# Patient Record
Sex: MALE | Race: WHITE | NOT HISPANIC OR LATINO | Employment: FULL TIME | URBAN - NONMETROPOLITAN AREA
[De-identification: names, ages, dates, MRNs, and addresses within clinical notes are randomized per-mention and may not be internally consistent; named-entity substitution may affect disease eponyms.]

---

## 2024-06-25 ENCOUNTER — HOSPITAL ENCOUNTER (EMERGENCY)
Facility: HOSPITAL | Age: 44
Discharge: HOME | End: 2024-06-25
Attending: EMERGENCY MEDICINE
Payer: COMMERCIAL

## 2024-06-25 VITALS
TEMPERATURE: 99.2 F | HEART RATE: 72 BPM | DIASTOLIC BLOOD PRESSURE: 73 MMHG | HEIGHT: 71 IN | BODY MASS INDEX: 29.4 KG/M2 | OXYGEN SATURATION: 95 % | RESPIRATION RATE: 16 BRPM | WEIGHT: 210 LBS | SYSTOLIC BLOOD PRESSURE: 121 MMHG

## 2024-06-25 DIAGNOSIS — K52.9 ACUTE GASTROENTERITIS: Primary | ICD-10-CM

## 2024-06-25 LAB
ALBUMIN SERPL BCP-MCNC: 4.1 G/DL (ref 3.4–5)
ALP SERPL-CCNC: 84 U/L (ref 33–120)
ALT SERPL W P-5'-P-CCNC: 23 U/L (ref 10–52)
ANION GAP SERPL CALC-SCNC: 12 MMOL/L (ref 10–20)
APPEARANCE UR: CLEAR
AST SERPL W P-5'-P-CCNC: 19 U/L (ref 9–39)
BASOPHILS # BLD AUTO: 0.02 X10*3/UL (ref 0–0.1)
BASOPHILS NFR BLD AUTO: 0.2 %
BILIRUB SERPL-MCNC: 0.5 MG/DL (ref 0–1.2)
BILIRUB UR STRIP.AUTO-MCNC: NEGATIVE MG/DL
BUN SERPL-MCNC: 18 MG/DL (ref 6–23)
CALCIUM SERPL-MCNC: 8.7 MG/DL (ref 8.6–10.3)
CHLORIDE SERPL-SCNC: 104 MMOL/L (ref 98–107)
CO2 SERPL-SCNC: 23 MMOL/L (ref 21–32)
COLOR UR: YELLOW
CREAT SERPL-MCNC: 0.89 MG/DL (ref 0.5–1.3)
EGFRCR SERPLBLD CKD-EPI 2021: >90 ML/MIN/1.73M*2
EOSINOPHIL # BLD AUTO: 0.02 X10*3/UL (ref 0–0.7)
EOSINOPHIL NFR BLD AUTO: 0.2 %
ERYTHROCYTE [DISTWIDTH] IN BLOOD BY AUTOMATED COUNT: 13.3 % (ref 11.5–14.5)
FLUAV RNA RESP QL NAA+PROBE: NOT DETECTED
FLUBV RNA RESP QL NAA+PROBE: NOT DETECTED
GLUCOSE SERPL-MCNC: 117 MG/DL (ref 74–99)
GLUCOSE UR STRIP.AUTO-MCNC: NORMAL MG/DL
HCT VFR BLD AUTO: 46.9 % (ref 41–52)
HGB BLD-MCNC: 15.7 G/DL (ref 13.5–17.5)
IMM GRANULOCYTES # BLD AUTO: 0.03 X10*3/UL (ref 0–0.7)
IMM GRANULOCYTES NFR BLD AUTO: 0.3 % (ref 0–0.9)
KETONES UR STRIP.AUTO-MCNC: NEGATIVE MG/DL
LEUKOCYTE ESTERASE UR QL STRIP.AUTO: NEGATIVE
LIPASE SERPL-CCNC: 10 U/L (ref 9–82)
LYMPHOCYTES # BLD AUTO: 0.78 X10*3/UL (ref 1.2–4.8)
LYMPHOCYTES NFR BLD AUTO: 6.9 %
MCH RBC QN AUTO: 30.7 PG (ref 26–34)
MCHC RBC AUTO-ENTMCNC: 33.5 G/DL (ref 32–36)
MCV RBC AUTO: 92 FL (ref 80–100)
MONOCYTES # BLD AUTO: 0.46 X10*3/UL (ref 0.1–1)
MONOCYTES NFR BLD AUTO: 4.1 %
MUCOUS THREADS #/AREA URNS AUTO: NORMAL /LPF
NEUTROPHILS # BLD AUTO: 9.99 X10*3/UL (ref 1.2–7.7)
NEUTROPHILS NFR BLD AUTO: 88.3 %
NITRITE UR QL STRIP.AUTO: NEGATIVE
NRBC BLD-RTO: 0 /100 WBCS (ref 0–0)
PH UR STRIP.AUTO: 6.5 [PH]
PLATELET # BLD AUTO: 234 X10*3/UL (ref 150–450)
POTASSIUM SERPL-SCNC: 3.6 MMOL/L (ref 3.5–5.3)
PROT SERPL-MCNC: 6.5 G/DL (ref 6.4–8.2)
PROT UR STRIP.AUTO-MCNC: ABNORMAL MG/DL
RBC # BLD AUTO: 5.12 X10*6/UL (ref 4.5–5.9)
RBC # UR STRIP.AUTO: NEGATIVE /UL
RBC #/AREA URNS AUTO: NORMAL /HPF
SARS-COV-2 RNA RESP QL NAA+PROBE: NOT DETECTED
SODIUM SERPL-SCNC: 135 MMOL/L (ref 136–145)
SP GR UR STRIP.AUTO: 1.04
UROBILINOGEN UR STRIP.AUTO-MCNC: ABNORMAL MG/DL
WBC # BLD AUTO: 11.3 X10*3/UL (ref 4.4–11.3)
WBC #/AREA URNS AUTO: NORMAL /HPF

## 2024-06-25 PROCEDURE — 80053 COMPREHEN METABOLIC PANEL: CPT | Performed by: EMERGENCY MEDICINE

## 2024-06-25 PROCEDURE — 96375 TX/PRO/DX INJ NEW DRUG ADDON: CPT

## 2024-06-25 PROCEDURE — 83690 ASSAY OF LIPASE: CPT | Performed by: EMERGENCY MEDICINE

## 2024-06-25 PROCEDURE — 81001 URINALYSIS AUTO W/SCOPE: CPT | Performed by: EMERGENCY MEDICINE

## 2024-06-25 PROCEDURE — 36415 COLL VENOUS BLD VENIPUNCTURE: CPT | Performed by: EMERGENCY MEDICINE

## 2024-06-25 PROCEDURE — 85025 COMPLETE CBC W/AUTO DIFF WBC: CPT | Performed by: EMERGENCY MEDICINE

## 2024-06-25 PROCEDURE — 99284 EMERGENCY DEPT VISIT MOD MDM: CPT

## 2024-06-25 PROCEDURE — 96374 THER/PROPH/DIAG INJ IV PUSH: CPT

## 2024-06-25 PROCEDURE — 2500000004 HC RX 250 GENERAL PHARMACY W/ HCPCS (ALT 636 FOR OP/ED): Performed by: EMERGENCY MEDICINE

## 2024-06-25 PROCEDURE — 87636 SARSCOV2 & INF A&B AMP PRB: CPT | Performed by: EMERGENCY MEDICINE

## 2024-06-25 PROCEDURE — 96361 HYDRATE IV INFUSION ADD-ON: CPT

## 2024-06-25 RX ORDER — ONDANSETRON HYDROCHLORIDE 2 MG/ML
4 INJECTION, SOLUTION INTRAVENOUS ONCE
Status: COMPLETED | OUTPATIENT
Start: 2024-06-25 | End: 2024-06-25

## 2024-06-25 RX ORDER — ONDANSETRON 4 MG/1
4 TABLET, ORALLY DISINTEGRATING ORAL EVERY 8 HOURS PRN
Qty: 30 TABLET | Refills: 0 | Status: SHIPPED | OUTPATIENT
Start: 2024-06-25

## 2024-06-25 RX ORDER — DICYCLOMINE HYDROCHLORIDE 20 MG/1
20 TABLET ORAL
Qty: 30 TABLET | Refills: 0 | Status: SHIPPED | OUTPATIENT
Start: 2024-06-25

## 2024-06-25 RX ORDER — KETOROLAC TROMETHAMINE 30 MG/ML
15 INJECTION, SOLUTION INTRAMUSCULAR; INTRAVENOUS ONCE
Status: COMPLETED | OUTPATIENT
Start: 2024-06-25 | End: 2024-06-25

## 2024-06-25 ASSESSMENT — PAIN DESCRIPTION - LOCATION
LOCATION: GENERALIZED
LOCATION: GENERALIZED

## 2024-06-25 ASSESSMENT — PAIN SCALES - GENERAL
PAINLEVEL_OUTOF10: 0 - NO PAIN
PAINLEVEL_OUTOF10: 6
PAINLEVEL_OUTOF10: 6

## 2024-06-25 ASSESSMENT — COLUMBIA-SUICIDE SEVERITY RATING SCALE - C-SSRS
6. HAVE YOU EVER DONE ANYTHING, STARTED TO DO ANYTHING, OR PREPARED TO DO ANYTHING TO END YOUR LIFE?: NO
1. IN THE PAST MONTH, HAVE YOU WISHED YOU WERE DEAD OR WISHED YOU COULD GO TO SLEEP AND NOT WAKE UP?: NO
2. HAVE YOU ACTUALLY HAD ANY THOUGHTS OF KILLING YOURSELF?: NO

## 2024-06-25 ASSESSMENT — PAIN DESCRIPTION - PROGRESSION: CLINICAL_PROGRESSION: NOT CHANGED

## 2024-06-25 ASSESSMENT — PAIN - FUNCTIONAL ASSESSMENT
PAIN_FUNCTIONAL_ASSESSMENT: 0-10
PAIN_FUNCTIONAL_ASSESSMENT: 0-10

## 2024-06-25 ASSESSMENT — PAIN DESCRIPTION - PAIN TYPE: TYPE: ACUTE PAIN

## 2024-06-25 NOTE — Clinical Note
Keith Pederson was seen and treated in our emergency department on 6/25/2024.  He may return to work on 06/27/2024.       If you have any questions or concerns, please don't hesitate to call.      Tramaine Anderson, DO

## 2024-06-26 LAB — HOLD SPECIMEN: NORMAL

## 2024-06-26 NOTE — ED PROVIDER NOTES
HPI   Chief Complaint   Patient presents with    Flu Symptoms     Pt comes in for flu symptoms. Pt states that late last night/early this am he started with nausea, vomiting, diarrhea, fevers, chills, fatigue, body aches. Pt states he took Nyquil around 1130 for a temp of 101.       Limitations to History: None    HPI: 44-year-old male presents with concern for nausea, vomiting, diarrhea.  Began approximately 12 hours ago.  Nonbloody nonbilious.  Abdominal cramping.  Denies any fever, chills, cough, chest pain, shortness of breath, urinary symptoms.  No sick contacts.    Additional History Obtained from: Significant other at the bedside.    ------------------------------------------------------------------------------------------------------------------------------------------  Physical Exam:    VS: As documented in the triage note and EMR flowsheet from this visit were reviewed.    Appearance: Alert. cooperative,  in no acute distress.   Skin: Intact,  dry skin, no lesions, rash, petechiae or purpura.   Eyes: PERRLA, EOMs intact,  Conjunctiva pink with no redness or exudates.   HENT: Normocephalic, atraumatic. Nares patent. No intraoral lesions.   Neck: Supple, without meningismus. Trachea at midline. No lymphadenopathy.  Pulmonary: Clear bilaterally with good chest wall excursion. No rales, rhonchi or wheezing. No accessory muscle use or stridor.  Cardiac: Regular rate and rhythm, no rubs, murmurs, or gallops.   Abdomen: Abdomen is soft, nontender, and nondistended.  No palpable organomegaly.  No rebound or guarding.  No CVA tenderness. Nonsurgical abdomen.  Genitourinary: Exam deferred.  Musculoskeletal: Full range of motion.  Pulses full and equal. No cyanosis, clubbing, or edema.  Psychiatric: Appropriate mood and affect.                            No data recorded                   Patient History   No past medical history on file.  No past surgical history on file.  No family history on file.  Social History      Tobacco Use    Smoking status: Not on file    Smokeless tobacco: Not on file   Substance Use Topics    Alcohol use: Not on file    Drug use: Not on file       Physical Exam   ED Triage Vitals [06/25/24 2015]   Temperature Heart Rate Respirations BP   37.3 °C (99.2 °F) 87 18 125/86      Pulse Ox Temp Source Heart Rate Source Patient Position   97 % Oral Monitor Sitting      BP Location FiO2 (%)     Left arm --       Physical Exam    ED Course & MDM   Diagnoses as of 06/25/24 2118   Acute gastroenteritis       Medical Decision Making  Labs Reviewed  CBC WITH AUTO DIFFERENTIAL - Abnormal     WBC                           11.3                   nRBC                          0.0                    RBC                           5.12                   Hemoglobin                    15.7                   Hematocrit                    46.9                   MCV                           92                     MCH                           30.7                   MCHC                          33.5                   RDW                           13.3                   Platelets                     234                    Neutrophils %                 88.3                   Immature Granulocytes %, Automated   0.3                    Lymphocytes %                 6.9                    Monocytes %                   4.1                    Eosinophils %                 0.2                    Basophils %                   0.2                    Neutrophils Absolute          9.99 (*)               Immature Granulocytes Absolute, Au*   0.03                   Lymphocytes Absolute          0.78 (*)               Monocytes Absolute            0.46                   Eosinophils Absolute          0.02                   Basophils Absolute            0.02                COMPREHENSIVE METABOLIC PANEL - Abnormal     Glucose                       117 (*)                Sodium                        135 (*)                Potassium                      3.6                    Chloride                      104                    Bicarbonate                   23                     Anion Gap                     12                     Urea Nitrogen                 18                     Creatinine                    0.89                   eGFR                          >90                    Calcium                       8.7                    Albumin                       4.1                    Alkaline Phosphatase          84                     Total Protein                 6.5                    AST                           19                     Bilirubin, Total              0.5                    ALT                           23                  LIPASE - Normal     Lipase                        10                         Narrative: Venipuncture immediately after or during the administration of Metamizole may lead to falsely low results. Testing should be performed immediately prior to Metamizole dosing.  INFLUENZA A AND B PCR - Normal     Flu A Result                                         Flu B Result                                             Narrative: This assay is an in vitro diagnostic multiplex nucleic acid amplification test for the detection and discrimination of Influenza A & B from nasopharyngeal specimens, and has been validated for use at [facility]. Negative results do not preclude Influenza A/B infections, and should not be used as the sole basis for diagnosis, treatment, or other management decisions. If Influenza A/B and RSV PCR results are negative, testing for Parainfluenza virus, Adenovirus and Metapneumovirus is routinely performed for [facility] pediatric oncology and intensive care inpatients, and is available on other patients by placing an add-on request.  SARS-COV-2 PCR - Normal     Coronavirus 2019, PCR                                    Narrative: This assay has received FDA Emergency Use Authorization  (EUA) and is only authorized for the duration of time that circumstances exist to justify the authorization of the emergency use of in vitro diagnostic tests for the detection of SARS-CoV-2 virus and/or diagnosis of COVID-19 infection under section 564(b)(1) of the Act, 21 U.S.C. 360bbb-3(b)(1). This assay is an in vitro diagnostic nucleic acid amplification test for the qualitative detection of SARS-CoV-2 from nasopharyngeal specimens and has been validated for use at [facility]. Negative results do not preclude COVID-19 infections and should not be used as the sole basis for diagnosis, treatment, or other management decisions.                    URINALYSIS WITH REFLEX CULTURE AND MICROSCOPIC         Narrative: The following orders were created for panel order Urinalysis with Reflex Culture and Microscopic.                  Procedure                               Abnormality         Status                                     ---------                               -----------         ------                                     Urinalysis with Reflex C...[[id number]]                                                                 Extra Urine Gray Tube[[id number]]                                                                                         Please view results for these tests on the individual orders.  URINALYSIS WITH REFLEX CULTURE AND MICROSCOPIC  EXTRA URINE GRAY TUBE    Medical Decision Making:    Patient appears well nontoxic.  Vital signs within normal limits.  Lab work unremarkable.  Patient treated with 1 L normal saline, Toradol, Zofran.  Feeling improved.  Will be written for Bentyl and Zofran at home.  Advised continued oral hydration.  Stable at time of discharge.    Differential Diagnoses Considered: Viral illness, gastroenteritis, electrolyte abnormality, volume depletion    Escalation of Care:  Appropriate for discharge and follow-up with primary care.    Prescription  Drug Consideration: Oral Bentyl and Zofran.            Procedure  Procedures     Tramaine Anderson, DO  06/25/24 5390

## 2024-11-01 ENCOUNTER — HOSPITAL ENCOUNTER (EMERGENCY)
Facility: HOSPITAL | Age: 44
Discharge: HOME | End: 2024-11-01
Attending: EMERGENCY MEDICINE
Payer: COMMERCIAL

## 2024-11-01 ENCOUNTER — APPOINTMENT (OUTPATIENT)
Dept: RADIOLOGY | Facility: HOSPITAL | Age: 44
End: 2024-11-01
Payer: COMMERCIAL

## 2024-11-01 VITALS
RESPIRATION RATE: 16 BRPM | TEMPERATURE: 98.5 F | OXYGEN SATURATION: 98 % | BODY MASS INDEX: 28.7 KG/M2 | SYSTOLIC BLOOD PRESSURE: 141 MMHG | DIASTOLIC BLOOD PRESSURE: 69 MMHG | HEART RATE: 69 BPM | HEIGHT: 71 IN | WEIGHT: 205 LBS

## 2024-11-01 DIAGNOSIS — S61.411A LACERATION OF RIGHT HAND, FOREIGN BODY PRESENCE UNSPECIFIED, INITIAL ENCOUNTER: Primary | ICD-10-CM

## 2024-11-01 PROCEDURE — 90715 TDAP VACCINE 7 YRS/> IM: CPT | Performed by: EMERGENCY MEDICINE

## 2024-11-01 PROCEDURE — 73130 X-RAY EXAM OF HAND: CPT | Mod: RIGHT SIDE | Performed by: RADIOLOGY

## 2024-11-01 PROCEDURE — 96372 THER/PROPH/DIAG INJ SC/IM: CPT | Mod: 59 | Performed by: EMERGENCY MEDICINE

## 2024-11-01 PROCEDURE — 90471 IMMUNIZATION ADMIN: CPT | Performed by: EMERGENCY MEDICINE

## 2024-11-01 PROCEDURE — 12001 RPR S/N/AX/GEN/TRNK 2.5CM/<: CPT | Performed by: PHYSICIAN ASSISTANT

## 2024-11-01 PROCEDURE — 2500000004 HC RX 250 GENERAL PHARMACY W/ HCPCS (ALT 636 FOR OP/ED): Performed by: EMERGENCY MEDICINE

## 2024-11-01 PROCEDURE — 2500000005 HC RX 250 GENERAL PHARMACY W/O HCPCS: Performed by: PHYSICIAN ASSISTANT

## 2024-11-01 PROCEDURE — 73130 X-RAY EXAM OF HAND: CPT | Mod: RT

## 2024-11-01 PROCEDURE — 99283 EMERGENCY DEPT VISIT LOW MDM: CPT | Mod: 25

## 2024-11-01 RX ORDER — CEFAZOLIN 1 G/1
1 INJECTION, POWDER, FOR SOLUTION INTRAVENOUS ONCE
Status: COMPLETED | OUTPATIENT
Start: 2024-11-01 | End: 2024-11-01

## 2024-11-01 RX ORDER — KETOROLAC TROMETHAMINE 30 MG/ML
15 INJECTION, SOLUTION INTRAMUSCULAR; INTRAVENOUS ONCE
Status: COMPLETED | OUTPATIENT
Start: 2024-11-01 | End: 2024-11-01

## 2024-11-01 RX ORDER — BACITRACIN ZINC 500 UNIT/G
OINTMENT IN PACKET (EA) TOPICAL 3 TIMES DAILY
Status: DISCONTINUED | OUTPATIENT
Start: 2024-11-01 | End: 2024-11-01 | Stop reason: HOSPADM

## 2024-11-01 RX ORDER — CEPHALEXIN 500 MG/1
500 CAPSULE ORAL 4 TIMES DAILY
Qty: 28 CAPSULE | Refills: 0 | Status: SHIPPED | OUTPATIENT
Start: 2024-11-01 | End: 2024-11-08

## 2024-11-01 RX ORDER — KETOROLAC TROMETHAMINE 10 MG/1
10 TABLET, FILM COATED ORAL EVERY 6 HOURS PRN
Qty: 20 TABLET | Refills: 0 | Status: SHIPPED | OUTPATIENT
Start: 2024-11-01 | End: 2024-11-06

## 2024-11-01 ASSESSMENT — PAIN SCALES - GENERAL: PAINLEVEL_OUTOF10: 6

## 2024-11-01 ASSESSMENT — PAIN DESCRIPTION - ORIENTATION: ORIENTATION: RIGHT

## 2024-11-01 ASSESSMENT — PAIN - FUNCTIONAL ASSESSMENT: PAIN_FUNCTIONAL_ASSESSMENT: 0-10

## 2024-11-01 ASSESSMENT — PAIN DESCRIPTION - LOCATION: LOCATION: HAND

## 2025-06-16 ENCOUNTER — APPOINTMENT (OUTPATIENT)
Dept: RADIOLOGY | Facility: HOSPITAL | Age: 45
End: 2025-06-16

## 2025-06-16 ENCOUNTER — HOSPITAL ENCOUNTER (OUTPATIENT)
Facility: HOSPITAL | Age: 45
Setting detail: OBSERVATION
LOS: 1 days | Discharge: HOME | End: 2025-06-17
Attending: EMERGENCY MEDICINE | Admitting: STUDENT IN AN ORGANIZED HEALTH CARE EDUCATION/TRAINING PROGRAM

## 2025-06-16 DIAGNOSIS — J18.9 PNEUMONIA OF RIGHT LOWER LOBE DUE TO INFECTIOUS ORGANISM: Primary | ICD-10-CM

## 2025-06-16 LAB
ALBUMIN SERPL BCP-MCNC: 3.8 G/DL (ref 3.4–5)
ALBUMIN SERPL BCP-MCNC: 4 G/DL (ref 3.4–5)
ALP SERPL-CCNC: 76 U/L (ref 33–120)
ALP SERPL-CCNC: 79 U/L (ref 33–120)
ALT SERPL W P-5'-P-CCNC: 20 U/L (ref 10–52)
ALT SERPL W P-5'-P-CCNC: 21 U/L (ref 10–52)
ANION GAP SERPL CALC-SCNC: 11 MMOL/L (ref 10–20)
ANION GAP SERPL CALC-SCNC: 11 MMOL/L (ref 10–20)
AST SERPL W P-5'-P-CCNC: 18 U/L (ref 9–39)
AST SERPL W P-5'-P-CCNC: 20 U/L (ref 9–39)
BASOPHILS # BLD AUTO: 0.04 X10*3/UL (ref 0–0.1)
BASOPHILS NFR BLD AUTO: 0.2 %
BILIRUB SERPL-MCNC: 0.3 MG/DL (ref 0–1.2)
BILIRUB SERPL-MCNC: 0.3 MG/DL (ref 0–1.2)
BUN SERPL-MCNC: 18 MG/DL (ref 6–23)
BUN SERPL-MCNC: 19 MG/DL (ref 6–23)
CALCIUM SERPL-MCNC: 8.6 MG/DL (ref 8.6–10.3)
CALCIUM SERPL-MCNC: 8.8 MG/DL (ref 8.6–10.3)
CHLORIDE SERPL-SCNC: 104 MMOL/L (ref 98–107)
CHLORIDE SERPL-SCNC: 105 MMOL/L (ref 98–107)
CO2 SERPL-SCNC: 23 MMOL/L (ref 21–32)
CO2 SERPL-SCNC: 23 MMOL/L (ref 21–32)
CREAT SERPL-MCNC: 1.15 MG/DL (ref 0.5–1.3)
CREAT SERPL-MCNC: 1.18 MG/DL (ref 0.5–1.3)
D DIMER PPP FEU-MCNC: 485 NG/ML FEU
EGFRCR SERPLBLD CKD-EPI 2021: 78 ML/MIN/1.73M*2
EGFRCR SERPLBLD CKD-EPI 2021: 80 ML/MIN/1.73M*2
EOSINOPHIL # BLD AUTO: 0.13 X10*3/UL (ref 0–0.7)
EOSINOPHIL NFR BLD AUTO: 0.7 %
ERYTHROCYTE [DISTWIDTH] IN BLOOD BY AUTOMATED COUNT: 13.5 % (ref 11.5–14.5)
ERYTHROCYTE [DISTWIDTH] IN BLOOD BY AUTOMATED COUNT: 13.7 % (ref 11.5–14.5)
FLUAV RNA RESP QL NAA+PROBE: NOT DETECTED
FLUBV RNA RESP QL NAA+PROBE: NOT DETECTED
GLUCOSE SERPL-MCNC: 116 MG/DL (ref 74–99)
GLUCOSE SERPL-MCNC: 116 MG/DL (ref 74–99)
HCT VFR BLD AUTO: 42.2 % (ref 41–52)
HCT VFR BLD AUTO: 43.2 % (ref 41–52)
HGB BLD-MCNC: 14 G/DL (ref 13.5–17.5)
HGB BLD-MCNC: 14.3 G/DL (ref 13.5–17.5)
HOLD SPECIMEN: NORMAL
HOLD SPECIMEN: NORMAL
IMM GRANULOCYTES # BLD AUTO: 0.07 X10*3/UL (ref 0–0.7)
IMM GRANULOCYTES NFR BLD AUTO: 0.4 % (ref 0–0.9)
LACTATE SERPL-SCNC: 0.6 MMOL/L (ref 0.4–2)
LYMPHOCYTES # BLD AUTO: 2.53 X10*3/UL (ref 1.2–4.8)
LYMPHOCYTES NFR BLD AUTO: 13.7 %
MAGNESIUM SERPL-MCNC: 1.63 MG/DL (ref 1.6–2.4)
MAGNESIUM SERPL-MCNC: 1.86 MG/DL (ref 1.6–2.4)
MCH RBC QN AUTO: 30.7 PG (ref 26–34)
MCH RBC QN AUTO: 31 PG (ref 26–34)
MCHC RBC AUTO-ENTMCNC: 32.4 G/DL (ref 32–36)
MCHC RBC AUTO-ENTMCNC: 33.9 G/DL (ref 32–36)
MCV RBC AUTO: 91 FL (ref 80–100)
MCV RBC AUTO: 95 FL (ref 80–100)
MONOCYTES # BLD AUTO: 1.08 X10*3/UL (ref 0.1–1)
MONOCYTES NFR BLD AUTO: 5.8 %
NEUTROPHILS # BLD AUTO: 14.64 X10*3/UL (ref 1.2–7.7)
NEUTROPHILS NFR BLD AUTO: 79.2 %
NRBC BLD-RTO: 0 /100 WBCS (ref 0–0)
NRBC BLD-RTO: 0 /100 WBCS (ref 0–0)
PLATELET # BLD AUTO: 233 X10*3/UL (ref 150–450)
PLATELET # BLD AUTO: 256 X10*3/UL (ref 150–450)
POTASSIUM SERPL-SCNC: 3.8 MMOL/L (ref 3.5–5.3)
POTASSIUM SERPL-SCNC: 4 MMOL/L (ref 3.5–5.3)
PROCALCITONIN SERPL-MCNC: 0.07 NG/ML
PROT SERPL-MCNC: 6.3 G/DL (ref 6.4–8.2)
PROT SERPL-MCNC: 6.3 G/DL (ref 6.4–8.2)
RBC # BLD AUTO: 4.56 X10*6/UL (ref 4.5–5.9)
RBC # BLD AUTO: 4.62 X10*6/UL (ref 4.5–5.9)
SODIUM SERPL-SCNC: 134 MMOL/L (ref 136–145)
SODIUM SERPL-SCNC: 135 MMOL/L (ref 136–145)
WBC # BLD AUTO: 18.5 X10*3/UL (ref 4.4–11.3)
WBC # BLD AUTO: 18.6 X10*3/UL (ref 4.4–11.3)

## 2025-06-16 PROCEDURE — 71046 X-RAY EXAM CHEST 2 VIEWS: CPT | Performed by: RADIOLOGY

## 2025-06-16 PROCEDURE — 96367 TX/PROPH/DG ADDL SEQ IV INF: CPT

## 2025-06-16 PROCEDURE — 80053 COMPREHEN METABOLIC PANEL: CPT | Performed by: EMERGENCY MEDICINE

## 2025-06-16 PROCEDURE — 87081 CULTURE SCREEN ONLY: CPT | Mod: SAMLAB | Performed by: STUDENT IN AN ORGANIZED HEALTH CARE EDUCATION/TRAINING PROGRAM

## 2025-06-16 PROCEDURE — 99222 1ST HOSP IP/OBS MODERATE 55: CPT | Performed by: STUDENT IN AN ORGANIZED HEALTH CARE EDUCATION/TRAINING PROGRAM

## 2025-06-16 PROCEDURE — 87040 BLOOD CULTURE FOR BACTERIA: CPT | Mod: SAMLAB | Performed by: EMERGENCY MEDICINE

## 2025-06-16 PROCEDURE — 83735 ASSAY OF MAGNESIUM: CPT | Performed by: STUDENT IN AN ORGANIZED HEALTH CARE EDUCATION/TRAINING PROGRAM

## 2025-06-16 PROCEDURE — 71046 X-RAY EXAM CHEST 2 VIEWS: CPT

## 2025-06-16 PROCEDURE — 1200000002 HC GENERAL ROOM WITH TELEMETRY DAILY

## 2025-06-16 PROCEDURE — 83605 ASSAY OF LACTIC ACID: CPT | Performed by: EMERGENCY MEDICINE

## 2025-06-16 PROCEDURE — 80053 COMPREHEN METABOLIC PANEL: CPT | Performed by: STUDENT IN AN ORGANIZED HEALTH CARE EDUCATION/TRAINING PROGRAM

## 2025-06-16 PROCEDURE — 87449 NOS EACH ORGANISM AG IA: CPT | Mod: SAMLAB | Performed by: STUDENT IN AN ORGANIZED HEALTH CARE EDUCATION/TRAINING PROGRAM

## 2025-06-16 PROCEDURE — 85379 FIBRIN DEGRADATION QUANT: CPT | Performed by: EMERGENCY MEDICINE

## 2025-06-16 PROCEDURE — 87899 AGENT NOS ASSAY W/OPTIC: CPT | Mod: SAMLAB | Performed by: STUDENT IN AN ORGANIZED HEALTH CARE EDUCATION/TRAINING PROGRAM

## 2025-06-16 PROCEDURE — 2500000004 HC RX 250 GENERAL PHARMACY W/ HCPCS (ALT 636 FOR OP/ED): Performed by: EMERGENCY MEDICINE

## 2025-06-16 PROCEDURE — 83735 ASSAY OF MAGNESIUM: CPT | Performed by: EMERGENCY MEDICINE

## 2025-06-16 PROCEDURE — 36415 COLL VENOUS BLD VENIPUNCTURE: CPT | Performed by: EMERGENCY MEDICINE

## 2025-06-16 PROCEDURE — 96375 TX/PRO/DX INJ NEW DRUG ADDON: CPT

## 2025-06-16 PROCEDURE — 84145 PROCALCITONIN (PCT): CPT | Mod: SAMLAB | Performed by: STUDENT IN AN ORGANIZED HEALTH CARE EDUCATION/TRAINING PROGRAM

## 2025-06-16 PROCEDURE — 99285 EMERGENCY DEPT VISIT HI MDM: CPT | Mod: 25 | Performed by: EMERGENCY MEDICINE

## 2025-06-16 PROCEDURE — 85027 COMPLETE CBC AUTOMATED: CPT | Performed by: STUDENT IN AN ORGANIZED HEALTH CARE EDUCATION/TRAINING PROGRAM

## 2025-06-16 PROCEDURE — 87502 INFLUENZA DNA AMP PROBE: CPT | Performed by: EMERGENCY MEDICINE

## 2025-06-16 PROCEDURE — 2500000001 HC RX 250 WO HCPCS SELF ADMINISTERED DRUGS (ALT 637 FOR MEDICARE OP): Performed by: HOSPITALIST

## 2025-06-16 PROCEDURE — 96365 THER/PROPH/DIAG IV INF INIT: CPT

## 2025-06-16 PROCEDURE — 85025 COMPLETE CBC W/AUTO DIFF WBC: CPT | Performed by: EMERGENCY MEDICINE

## 2025-06-16 RX ORDER — PROCHLORPERAZINE EDISYLATE 5 MG/ML
10 INJECTION INTRAMUSCULAR; INTRAVENOUS EVERY 6 HOURS PRN
Status: DISCONTINUED | OUTPATIENT
Start: 2025-06-16 | End: 2025-06-17 | Stop reason: HOSPADM

## 2025-06-16 RX ORDER — CALCIUM CARBONATE 200(500)MG
2 TABLET,CHEWABLE ORAL 4 TIMES DAILY PRN
Status: DISCONTINUED | OUTPATIENT
Start: 2025-06-16 | End: 2025-06-17 | Stop reason: HOSPADM

## 2025-06-16 RX ORDER — GUAIFENESIN 600 MG/1
600 TABLET, EXTENDED RELEASE ORAL 2 TIMES DAILY PRN
Status: DISCONTINUED | OUTPATIENT
Start: 2025-06-16 | End: 2025-06-17 | Stop reason: HOSPADM

## 2025-06-16 RX ORDER — CEFTRIAXONE 1 G/50ML
1 INJECTION, SOLUTION INTRAVENOUS EVERY 24 HOURS
Status: DISCONTINUED | OUTPATIENT
Start: 2025-06-17 | End: 2025-06-17 | Stop reason: HOSPADM

## 2025-06-16 RX ORDER — METOPROLOL TARTRATE 1 MG/ML
5 INJECTION, SOLUTION INTRAVENOUS EVERY 6 HOURS PRN
Status: DISCONTINUED | OUTPATIENT
Start: 2025-06-16 | End: 2025-06-17 | Stop reason: HOSPADM

## 2025-06-16 RX ORDER — ONDANSETRON HYDROCHLORIDE 2 MG/ML
4 INJECTION, SOLUTION INTRAVENOUS EVERY 4 HOURS PRN
Status: DISCONTINUED | OUTPATIENT
Start: 2025-06-16 | End: 2025-06-17 | Stop reason: HOSPADM

## 2025-06-16 RX ORDER — BISACODYL 10 MG/1
10 SUPPOSITORY RECTAL DAILY PRN
Status: DISCONTINUED | OUTPATIENT
Start: 2025-06-16 | End: 2025-06-17 | Stop reason: HOSPADM

## 2025-06-16 RX ORDER — HYDRALAZINE HYDROCHLORIDE 20 MG/ML
10 INJECTION INTRAMUSCULAR; INTRAVENOUS EVERY 4 HOURS PRN
Status: DISCONTINUED | OUTPATIENT
Start: 2025-06-16 | End: 2025-06-17 | Stop reason: HOSPADM

## 2025-06-16 RX ORDER — POLYETHYLENE GLYCOL 3350 17 G/17G
17 POWDER, FOR SOLUTION ORAL DAILY PRN
Status: DISCONTINUED | OUTPATIENT
Start: 2025-06-16 | End: 2025-06-17 | Stop reason: HOSPADM

## 2025-06-16 RX ORDER — NAPROXEN 500 MG/1
500 TABLET ORAL
Status: DISCONTINUED | OUTPATIENT
Start: 2025-06-16 | End: 2025-06-17 | Stop reason: HOSPADM

## 2025-06-16 RX ORDER — TALC
6 POWDER (GRAM) TOPICAL NIGHTLY PRN
Status: DISCONTINUED | OUTPATIENT
Start: 2025-06-16 | End: 2025-06-17 | Stop reason: HOSPADM

## 2025-06-16 RX ORDER — TRAZODONE HYDROCHLORIDE 50 MG/1
50 TABLET ORAL NIGHTLY PRN
Status: DISCONTINUED | OUTPATIENT
Start: 2025-06-16 | End: 2025-06-17 | Stop reason: HOSPADM

## 2025-06-16 RX ORDER — ACETAMINOPHEN 325 MG/1
650 TABLET ORAL EVERY 4 HOURS PRN
Status: DISCONTINUED | OUTPATIENT
Start: 2025-06-16 | End: 2025-06-17 | Stop reason: HOSPADM

## 2025-06-16 RX ORDER — ALUMINUM HYDROXIDE, MAGNESIUM HYDROXIDE, AND SIMETHICONE 1200; 120; 1200 MG/30ML; MG/30ML; MG/30ML
20 SUSPENSION ORAL 4 TIMES DAILY PRN
Status: DISCONTINUED | OUTPATIENT
Start: 2025-06-16 | End: 2025-06-17 | Stop reason: HOSPADM

## 2025-06-16 RX ORDER — BENZONATATE 100 MG/1
100 CAPSULE ORAL 3 TIMES DAILY PRN
Status: DISCONTINUED | OUTPATIENT
Start: 2025-06-16 | End: 2025-06-17 | Stop reason: HOSPADM

## 2025-06-16 RX ORDER — CEFTRIAXONE 1 G/50ML
1 INJECTION, SOLUTION INTRAVENOUS ONCE
Status: COMPLETED | OUTPATIENT
Start: 2025-06-16 | End: 2025-06-16

## 2025-06-16 RX ORDER — HYDROXYZINE HYDROCHLORIDE 50 MG/1
25 TABLET, FILM COATED ORAL EVERY 4 HOURS PRN
Status: DISCONTINUED | OUTPATIENT
Start: 2025-06-16 | End: 2025-06-17 | Stop reason: HOSPADM

## 2025-06-16 RX ORDER — KETOROLAC TROMETHAMINE 30 MG/ML
15 INJECTION, SOLUTION INTRAMUSCULAR; INTRAVENOUS EVERY 6 HOURS PRN
Status: DISCONTINUED | OUTPATIENT
Start: 2025-06-16 | End: 2025-06-17 | Stop reason: HOSPADM

## 2025-06-16 RX ORDER — KETOROLAC TROMETHAMINE 30 MG/ML
15 INJECTION, SOLUTION INTRAMUSCULAR; INTRAVENOUS ONCE
Status: COMPLETED | OUTPATIENT
Start: 2025-06-16 | End: 2025-06-16

## 2025-06-16 RX ADMIN — NAPROXEN 500 MG: 500 TABLET ORAL at 16:43

## 2025-06-16 RX ADMIN — CEFTRIAXONE 1 G: 1 INJECTION, SOLUTION INTRAVENOUS at 02:06

## 2025-06-16 RX ADMIN — NAPROXEN 500 MG: 500 TABLET ORAL at 09:08

## 2025-06-16 RX ADMIN — AZITHROMYCIN MONOHYDRATE 500 MG: 500 INJECTION, POWDER, LYOPHILIZED, FOR SOLUTION INTRAVENOUS at 02:42

## 2025-06-16 RX ADMIN — KETOROLAC TROMETHAMINE 15 MG: 30 INJECTION, SOLUTION INTRAMUSCULAR at 02:05

## 2025-06-16 SDOH — HEALTH STABILITY: MENTAL HEALTH
DO YOU FEEL STRESS - TENSE, RESTLESS, NERVOUS, OR ANXIOUS, OR UNABLE TO SLEEP AT NIGHT BECAUSE YOUR MIND IS TROUBLED ALL THE TIME - THESE DAYS?: PATIENT DECLINED

## 2025-06-16 SDOH — SOCIAL STABILITY: SOCIAL INSECURITY: ARE YOU OR HAVE YOU BEEN THREATENED OR ABUSED PHYSICALLY, EMOTIONALLY, OR SEXUALLY BY ANYONE?: NO

## 2025-06-16 SDOH — SOCIAL STABILITY: SOCIAL INSECURITY: WITHIN THE LAST YEAR, HAVE YOU BEEN AFRAID OF YOUR PARTNER OR EX-PARTNER?: PATIENT DECLINED

## 2025-06-16 SDOH — ECONOMIC STABILITY: FOOD INSECURITY: WITHIN THE PAST 12 MONTHS, THE FOOD YOU BOUGHT JUST DIDN'T LAST AND YOU DIDN'T HAVE MONEY TO GET MORE.: PATIENT DECLINED

## 2025-06-16 SDOH — SOCIAL STABILITY: SOCIAL NETWORK: HOW OFTEN DO YOU GET TOGETHER WITH FRIENDS OR RELATIVES?: PATIENT DECLINED

## 2025-06-16 SDOH — SOCIAL STABILITY: SOCIAL NETWORK: HOW OFTEN DO YOU ATTEND CHURCH OR RELIGIOUS SERVICES?: PATIENT DECLINED

## 2025-06-16 SDOH — ECONOMIC STABILITY: INCOME INSECURITY
IN THE PAST 12 MONTHS HAS THE ELECTRIC, GAS, OIL, OR WATER COMPANY THREATENED TO SHUT OFF SERVICES IN YOUR HOME?: PATIENT DECLINED

## 2025-06-16 SDOH — SOCIAL STABILITY: SOCIAL INSECURITY: ARE YOU MARRIED, WIDOWED, DIVORCED, SEPARATED, NEVER MARRIED, OR LIVING WITH A PARTNER?: PATIENT DECLINED

## 2025-06-16 SDOH — SOCIAL STABILITY: SOCIAL INSECURITY
WITHIN THE LAST YEAR, HAVE YOU BEEN RAPED OR FORCED TO HAVE ANY KIND OF SEXUAL ACTIVITY BY YOUR PARTNER OR EX-PARTNER?: PATIENT DECLINED

## 2025-06-16 SDOH — SOCIAL STABILITY: SOCIAL NETWORK
DO YOU BELONG TO ANY CLUBS OR ORGANIZATIONS SUCH AS CHURCH GROUPS, UNIONS, FRATERNAL OR ATHLETIC GROUPS, OR SCHOOL GROUPS?: PATIENT DECLINED

## 2025-06-16 SDOH — SOCIAL STABILITY: SOCIAL NETWORK: IN A TYPICAL WEEK, HOW MANY TIMES DO YOU TALK ON THE PHONE WITH FAMILY, FRIENDS, OR NEIGHBORS?: PATIENT DECLINED

## 2025-06-16 SDOH — SOCIAL STABILITY: SOCIAL NETWORK: HOW OFTEN DO YOU ATTEND MEETINGS OF THE CLUBS OR ORGANIZATIONS YOU BELONG TO?: PATIENT DECLINED

## 2025-06-16 SDOH — ECONOMIC STABILITY: FOOD INSECURITY
WITHIN THE PAST 12 MONTHS, YOU WORRIED THAT YOUR FOOD WOULD RUN OUT BEFORE YOU GOT THE MONEY TO BUY MORE.: PATIENT DECLINED

## 2025-06-16 SDOH — SOCIAL STABILITY: SOCIAL INSECURITY
WITHIN THE LAST YEAR, HAVE YOU BEEN KICKED, HIT, SLAPPED, OR OTHERWISE PHYSICALLY HURT BY YOUR PARTNER OR EX-PARTNER?: PATIENT DECLINED

## 2025-06-16 SDOH — SOCIAL STABILITY: SOCIAL INSECURITY: ARE THERE ANY APPARENT SIGNS OF INJURIES/BEHAVIORS THAT COULD BE RELATED TO ABUSE/NEGLECT?: NO

## 2025-06-16 SDOH — SOCIAL STABILITY: SOCIAL INSECURITY: HAVE YOU HAD THOUGHTS OF HARMING ANYONE ELSE?: YES

## 2025-06-16 SDOH — SOCIAL STABILITY: SOCIAL INSECURITY: DOES ANYONE TRY TO KEEP YOU FROM HAVING/CONTACTING OTHER FRIENDS OR DOING THINGS OUTSIDE YOUR HOME?: NO

## 2025-06-16 SDOH — SOCIAL STABILITY: SOCIAL INSECURITY
WITHIN THE LAST YEAR, HAVE YOU BEEN HUMILIATED OR EMOTIONALLY ABUSED IN OTHER WAYS BY YOUR PARTNER OR EX-PARTNER?: PATIENT DECLINED

## 2025-06-16 SDOH — SOCIAL STABILITY: SOCIAL INSECURITY: ABUSE: ADULT

## 2025-06-16 SDOH — SOCIAL STABILITY: SOCIAL INSECURITY: WERE YOU ABLE TO COMPLETE ALL THE BEHAVIORAL HEALTH SCREENINGS?: YES

## 2025-06-16 SDOH — SOCIAL STABILITY: SOCIAL INSECURITY: DO YOU FEEL UNSAFE GOING BACK TO THE PLACE WHERE YOU ARE LIVING?: NO

## 2025-06-16 SDOH — SOCIAL STABILITY: SOCIAL INSECURITY: DO YOU FEEL ANYONE HAS EXPLOITED OR TAKEN ADVANTAGE OF YOU FINANCIALLY OR OF YOUR PERSONAL PROPERTY?: NO

## 2025-06-16 SDOH — SOCIAL STABILITY: SOCIAL INSECURITY: HAS ANYONE EVER THREATENED TO HURT YOUR FAMILY OR YOUR PETS?: NO

## 2025-06-16 SDOH — SOCIAL STABILITY: SOCIAL INSECURITY: HAVE YOU HAD ANY THOUGHTS OF HARMING ANYONE ELSE?: NO

## 2025-06-16 ASSESSMENT — COGNITIVE AND FUNCTIONAL STATUS - GENERAL
MOBILITY SCORE: 24
PATIENT BASELINE BEDBOUND: NO
MOBILITY SCORE: 24
DAILY ACTIVITIY SCORE: 24
DAILY ACTIVITIY SCORE: 24

## 2025-06-16 ASSESSMENT — PAIN SCALES - GENERAL
PAINLEVEL_OUTOF10: 5 - MODERATE PAIN
PAINLEVEL_OUTOF10: 6
PAINLEVEL_OUTOF10: 0 - NO PAIN
PAINLEVEL_OUTOF10: 8
PAINLEVEL_OUTOF10: 0 - NO PAIN
PAINLEVEL_OUTOF10: 1

## 2025-06-16 ASSESSMENT — PAIN DESCRIPTION - LOCATION
LOCATION: RIB CAGE
LOCATION: RIB CAGE

## 2025-06-16 ASSESSMENT — ACTIVITIES OF DAILY LIVING (ADL)
BATHING: INDEPENDENT
DRESSING YOURSELF: INDEPENDENT
PATIENT'S MEMORY ADEQUATE TO SAFELY COMPLETE DAILY ACTIVITIES?: YES
LACK_OF_TRANSPORTATION: PATIENT DECLINED
HEARING - RIGHT EAR: FUNCTIONAL
LACK_OF_TRANSPORTATION: NO
WALKS IN HOME: INDEPENDENT
GROOMING: INDEPENDENT
ADEQUATE_TO_COMPLETE_ADL: YES
LACK_OF_TRANSPORTATION: PATIENT DECLINED
FEEDING YOURSELF: INDEPENDENT
TOILETING: INDEPENDENT
HEARING - LEFT EAR: FUNCTIONAL
JUDGMENT_ADEQUATE_SAFELY_COMPLETE_DAILY_ACTIVITIES: YES

## 2025-06-16 ASSESSMENT — PAIN DESCRIPTION - ORIENTATION
ORIENTATION: RIGHT
ORIENTATION: RIGHT

## 2025-06-16 ASSESSMENT — PAIN DESCRIPTION - DESCRIPTORS: DESCRIPTORS: SHARP;SHOOTING

## 2025-06-16 ASSESSMENT — PAIN - FUNCTIONAL ASSESSMENT
PAIN_FUNCTIONAL_ASSESSMENT: 0-10

## 2025-06-16 ASSESSMENT — PATIENT HEALTH QUESTIONNAIRE - PHQ9
2. FEELING DOWN, DEPRESSED OR HOPELESS: NOT AT ALL
SUM OF ALL RESPONSES TO PHQ9 QUESTIONS 1 & 2: 0
1. LITTLE INTEREST OR PLEASURE IN DOING THINGS: NOT AT ALL

## 2025-06-16 ASSESSMENT — LIFESTYLE VARIABLES
AUDIT-C TOTAL SCORE: 0
SKIP TO QUESTIONS 9-10: 1
HOW OFTEN DO YOU HAVE 6 OR MORE DRINKS ON ONE OCCASION: NEVER
HOW MANY STANDARD DRINKS CONTAINING ALCOHOL DO YOU HAVE ON A TYPICAL DAY: PATIENT DOES NOT DRINK
AUDIT-C TOTAL SCORE: 0
HOW OFTEN DO YOU HAVE A DRINK CONTAINING ALCOHOL: NEVER

## 2025-06-16 NOTE — PROGRESS NOTES
This is a 45 years old male patient admitted because of community-acquired pneumonia.  Patient complained of right lateral pleuritic chest pain which prompted him to come to the emergency department.  He mentioned the pain is getting better.  He reported dry cough, no significant sputum production, denies fever or chills.  He takes no prescription medication at home.  He is on IV Rocephin and Zithromax.  Today's routine blood work reviewed, remarkable for leukocytosis, otherwise normal.  Testing for influenza A and B were negative.  Plan to continue IV antibiotics, start naproxen twice a day for couple of days for pleuritic inflammation, repeat CBC and BMP tomorrow morning, anticipate discharge home in the next 24 to 48 hours.

## 2025-06-16 NOTE — PROGRESS NOTES
06/16/25 0955   Discharge Planning   Living Arrangements Spouse/significant other   Support Systems Spouse/significant other   Assistance Needed none   Type of Residence Private residence   Number of Stairs to Enter Residence 20   Number of Stairs Within Residence 0   Do you have animals or pets at home? Yes   Type of Animals or Pets 1 cat   Who is requesting discharge planning? Provider   Home or Post Acute Services None   Expected Discharge Disposition Home   Does the patient need discharge transport arranged? No   Financial Resource Strain   How hard is it for you to pay for the very basics like food, housing, medical care, and heating? Somewhat   Housing Stability   In the last 12 months, was there a time when you were not able to pay the mortgage or rent on time? N   In the past 12 months, how many times have you moved where you were living? 0   At any time in the past 12 months, were you homeless or living in a shelter (including now)? N   Transportation Needs   In the past 12 months, has lack of transportation kept you from medical appointments or from getting medications? no   In the past 12 months, has lack of transportation kept you from meetings, work, or from getting things needed for daily living? No   Stroke Family Assessment   Stroke Family Assessment Needed No   Intensity of Service   Intensity of Service 0-30 min     Met with pt at the bedside and verified address, phone number and emergency contact information. Has no PCP and preferred pharmacy is [facility], denies issues obtaining or affording medications and takes as ordered. Pt is independent, lives at home with his significant other and feels safe. [facility] 24/24. ADOD 24 hours. Plans to discharge home as before with no new needs.  Care Transitions to follow. Nila CARTWRIGHTN/RN-TCC

## 2025-06-16 NOTE — ED PROVIDER NOTES
HPI   Chief Complaint   Patient presents with    Cough     Pt states he had a coughing fit 2 days ago and sine then he had right sided rib pain into the lungs while coughing. States he had a fever today, took ibuprofen about an hour ago       45-year-old male presents with 2 to 3-day history of a cough with associated pleuritic type of pain.  Patient is having pain in the right lower lateral chest wall.  Patient states cough is nonproductive.  Patient has also had some bodyaches and chills.  Patient denies any nausea vomiting or diarrhea.  Patient is a 1 pack/day smoker.  Denies any exposure to anyone who has been sick lately.  I did go over the results of the test which indicate patient has a right lower lobe pneumonia.  Patient will be given a dose of Rocephin 1 g and Zithromax 500 mg IV and admitted.  Blood cultures will be obtained prior to initiation of antibiotics.  Patient will be given 15 mg of Toradol IV for pleuritic pain.  The patient did require 2 L of oxygen via nasal cannula since his room air saturation levels were 92 or less.      History provided by:  Patient and spouse          Patient History   Medical History[1]  Surgical History[2]  Family History[3]  Social History[4]    Physical Exam   ED Triage Vitals [06/16/25 0055]   Temperature Heart Rate Respirations BP   36.8 °C (98.2 °F) 84 18 136/78      Pulse Ox Temp Source Heart Rate Source Patient Position   (!) 92 % Temporal Monitor --      BP Location FiO2 (%)     -- --       Physical Exam  Vitals and nursing note reviewed.   Constitutional:       Appearance: Normal appearance.   HENT:      Head: Normocephalic and atraumatic.      Nose: Nose normal.      Mouth/Throat:      Mouth: Mucous membranes are moist.   Cardiovascular:      Rate and Rhythm: Normal rate and regular rhythm.   Pulmonary:      Effort: Pulmonary effort is normal.      Comments: Slightly diminished breath sounds right lung base  Abdominal:      Palpations: Abdomen is soft.    Musculoskeletal:         General: Normal range of motion.      Cervical back: Normal range of motion and neck supple.   Skin:     General: Skin is warm and dry.   Neurological:      General: No focal deficit present.      Mental Status: He is alert and oriented to person, place, and time.       Labs Reviewed   CBC WITH AUTO DIFFERENTIAL - Abnormal       Result Value    WBC 18.5 (*)     nRBC 0.0      RBC 4.62      Hemoglobin 14.3      Hematocrit 42.2      MCV 91      MCH 31.0      MCHC 33.9      RDW 13.5      Platelets 256      Neutrophils % 79.2      Immature Granulocytes %, Automated 0.4      Lymphocytes % 13.7      Monocytes % 5.8      Eosinophils % 0.7      Basophils % 0.2      Neutrophils Absolute 14.64 (*)     Immature Granulocytes Absolute, Automated 0.07      Lymphocytes Absolute 2.53      Monocytes Absolute 1.08 (*)     Eosinophils Absolute 0.13      Basophils Absolute 0.04     COMPREHENSIVE METABOLIC PANEL - Abnormal    Glucose 116 (*)     Sodium 135 (*)     Potassium 4.0      Chloride 105      Bicarbonate 23      Anion Gap 11      Urea Nitrogen 18      Creatinine 1.18      eGFR 78      Calcium 8.8      Albumin 4.0      Alkaline Phosphatase 79      Total Protein 6.3 (*)     AST 20      Bilirubin, Total 0.3      ALT 21     MAGNESIUM - Normal    Magnesium 1.63     LACTATE - Normal    Lactate 0.6      Narrative:     Venipuncture immediately after or during the administration of Metamizole may lead to falsely low results. Testing should be performed immediately prior to Metamizole dosing.   D-DIMER, VTE EXCLUSION - Normal    D-Dimer, Quantitative VTE Exclusion 485      Narrative:     The VTE Exclusion D-Dimer assay is reported in ng/mL Fibrinogen Equivalent Units (FEU).    Per 's instructions for use, a value of less than 500 ng/mL (FEU) may help to exclude DVT or PE in outpatients when the assay is used with a clinical pretest probability assessment.([facility] must utilize and document eCalc 'Wells  Score Deep Vein Thrombosis Risk' for DVT exclusion only. Emergency Department should utilize  Guidelines for Emergency Department Use of the VTE Exclusion D-Dimer and Clinical Pretest probability assessment model for DVT or PE exclusion.)   BLOOD CULTURE   INFLUENZA A AND B PCR      XR chest 2 views   Final Result   Right lower lobe pneumonia.        MACRO:   None        Signed by: Radha Mccarthy 6/16/2025 1:49 AM   Dictation workstation:   [id number]           ED Course & MDM   Diagnoses as of 06/16/25 0204   Pneumonia of right lower lobe due to infectious organism                 No data recorded                                 Medical Decision Making  admission    Procedure  Procedures       [1] History reviewed. No pertinent past medical history.  [2] History reviewed. No pertinent surgical history.  [3] No family history on file.  [4]   Social History  Tobacco Use    Smoking status: Every Day     Types: Cigarettes    Smokeless tobacco: Never   Vaping Use    Vaping status: Never Used   Substance Use Topics    Alcohol use: Yes     Comment: occasionally    Drug use: Yes     Types: Marijuana     Comment: 2/week        Magdaleno Hurtado DO  06/16/25 0204

## 2025-06-16 NOTE — H&P
History Of Present Illness  Keith Pederson is a 45 y.o. male presenting with cough x3 days, as well as rib pain. Pt has no pertinent medical history. Only home meds are PRN.   He is a smoker.   CXR shows RLL pneumonia.   In ED, he was placed on 2L NC. VSS. He was given azithro and ceftriaxone. Toradol was given for rib pain.      Chief Complaint   Patient presents with    Cough     Pt states he had a coughing fit 2 days ago and sine then he had right sided rib pain into the lungs while coughing. States he had a fever today, took ibuprofen about an hour ago          HPI obtained from pt.  Notes from ED physician and nurse reviewed. Case discussed with attending ED physician.     Past Medical History  Medical History[1]     Surgical History  Surgical History[2]   Recent Surgeries in Hospitalist            No cases to display           Surgical History[3]      Social History  Social History     Substance and Sexual Activity   Alcohol Use Yes    Comment: occasionally      Social History     Substance and Sexual Activity   Drug Use Yes    Types: Marijuana    Comment: 2/week      Social History     Substance and Sexual Activity   Sexual Activity Not on file      Tobacco Use History[4]   Food Insecurity: Patient Declined (6/16/2025)    Hunger Vital Sign     Worried About Running Out of Food in the Last Year: Patient declined     Ran Out of Food in the Last Year: Patient declined      Financial Resource Strain: Patient Declined (6/16/2025)    Overall Financial Resource Strain (CARDIA)     Difficulty of Paying Living Expenses: Patient declined      Intimate Partner Violence: Patient Declined (6/16/2025)    Humiliation, Afraid, Rape, and Kick questionnaire     Fear of Current or Ex-Partner: Patient declined     Emotionally Abused: Patient declined     Physically Abused: Patient declined     Sexually Abused: Patient declined      Transportation Needs: Patient Declined (6/16/2025)    PRAPARE - Transportation     Lack of  "Transportation (Medical): Patient declined     Lack of Transportation (Non-Medical): Patient declined        Family History  Family History[5]      Allergies  RX Allergies[6]   Allergies[7]     Physical Exam   Physical Exam   Constitutional:       Appearance: Normal appearance.   HENT:      Head: Normocephalic and atraumatic.      Nose: Nose normal.      Mouth/Throat:      Mouth: Mucous membranes are moist.   Cardiovascular:      Rate and Rhythm: Normal rate and regular rhythm.   Pulmonary:      Effort: Pulmonary effort is normal.      Comments: Slightly diminished breath sounds right lung base  Abdominal:      Palpations: Abdomen is soft.   Musculoskeletal:         General: Normal range of motion.      Cervical back: Normal range of motion and neck supple.   Skin:     General: Skin is warm and dry.   Neurological:      General: No focal deficit present.      Mental Status: He is alert and oriented to person, place, and time.   Last Recorded Vitals  Visit Vitals  /69   Pulse 71   Temp 36.3 °C (97.3 °F) (Temporal)   Resp 16      Visit Vitals  /69   Pulse 71   Temp 36.3 °C (97.3 °F) (Temporal)   Resp 16   Ht 1.803 m (5' 11\")   Wt 94.2 kg (207 lb 10.8 oz)   SpO2 96%   BMI 28.96 kg/m²   Smoking Status Every Day   BSA 2.17 m²        Relevant Results      Results for orders placed or performed during the hospital encounter of 06/16/25 (from the past 24 hours)   CBC and Auto Differential   Result Value Ref Range    WBC 18.5 (H) 4.4 - 11.3 x10*3/uL    nRBC 0.0 0.0 - 0.0 /100 WBCs    RBC 4.62 4.50 - 5.90 x10*6/uL    Hemoglobin 14.3 13.5 - 17.5 g/dL    Hematocrit 42.2 41.0 - 52.0 %    MCV 91 80 - 100 fL    MCH 31.0 26.0 - 34.0 pg    MCHC 33.9 32.0 - 36.0 g/dL    RDW 13.5 11.5 - 14.5 %    Platelets 256 150 - 450 x10*3/uL    Neutrophils % 79.2 40.0 - 80.0 %    Immature Granulocytes %, Automated 0.4 0.0 - 0.9 %    Lymphocytes % 13.7 13.0 - 44.0 %    Monocytes % 5.8 2.0 - 10.0 %    Eosinophils % 0.7 0.0 - 6.0 %    " Basophils % 0.2 0.0 - 2.0 %    Neutrophils Absolute 14.64 (H) 1.20 - 7.70 x10*3/uL    Immature Granulocytes Absolute, Automated 0.07 0.00 - 0.70 x10*3/uL    Lymphocytes Absolute 2.53 1.20 - 4.80 x10*3/uL    Monocytes Absolute 1.08 (H) 0.10 - 1.00 x10*3/uL    Eosinophils Absolute 0.13 0.00 - 0.70 x10*3/uL    Basophils Absolute 0.04 0.00 - 0.10 x10*3/uL   Magnesium   Result Value Ref Range    Magnesium 1.63 1.60 - 2.40 mg/dL   Comprehensive metabolic panel   Result Value Ref Range    Glucose 116 (H) 74 - 99 mg/dL    Sodium 135 (L) 136 - 145 mmol/L    Potassium 4.0 3.5 - 5.3 mmol/L    Chloride 105 98 - 107 mmol/L    Bicarbonate 23 21 - 32 mmol/L    Anion Gap 11 10 - 20 mmol/L    Urea Nitrogen 18 6 - 23 mg/dL    Creatinine 1.18 0.50 - 1.30 mg/dL    eGFR 78 >60 mL/min/1.73m*2    Calcium 8.8 8.6 - 10.3 mg/dL    Albumin 4.0 3.4 - 5.0 g/dL    Alkaline Phosphatase 79 33 - 120 U/L    Total Protein 6.3 (L) 6.4 - 8.2 g/dL    AST 20 9 - 39 U/L    Bilirubin, Total 0.3 0.0 - 1.2 mg/dL    ALT 21 10 - 52 U/L   Lactate   Result Value Ref Range    Lactate 0.6 0.4 - 2.0 mmol/L   D-Dimer, VTE Exclusion   Result Value Ref Range    D-Dimer, Quantitative VTE Exclusion 485 <=500 ng/mL FEU   Influenza A, and B PCR   Result Value Ref Range    Flu A Result Not Detected Not Detected    Flu B Result Not Detected Not Detected   Magnesium   Result Value Ref Range    Magnesium 1.86 1.60 - 2.40 mg/dL   CBC   Result Value Ref Range    WBC 18.6 (H) 4.4 - 11.3 x10*3/uL    nRBC 0.0 0.0 - 0.0 /100 WBCs    RBC 4.56 4.50 - 5.90 x10*6/uL    Hemoglobin 14.0 13.5 - 17.5 g/dL    Hematocrit 43.2 41.0 - 52.0 %    MCV 95 80 - 100 fL    MCH 30.7 26.0 - 34.0 pg    MCHC 32.4 32.0 - 36.0 g/dL    RDW 13.7 11.5 - 14.5 %    Platelets 233 150 - 450 x10*3/uL   Comprehensive Metabolic Panel   Result Value Ref Range    Glucose 116 (H) 74 - 99 mg/dL    Sodium 134 (L) 136 - 145 mmol/L    Potassium 3.8 3.5 - 5.3 mmol/L    Chloride 104 98 - 107 mmol/L    Bicarbonate 23 21 - 32  mmol/L    Anion Gap 11 10 - 20 mmol/L    Urea Nitrogen 19 6 - 23 mg/dL    Creatinine 1.15 0.50 - 1.30 mg/dL    eGFR 80 >60 mL/min/1.73m*2    Calcium 8.6 8.6 - 10.3 mg/dL    Albumin 3.8 3.4 - 5.0 g/dL    Alkaline Phosphatase 76 33 - 120 U/L    Total Protein 6.3 (L) 6.4 - 8.2 g/dL    AST 18 9 - 39 U/L    Bilirubin, Total 0.3 0.0 - 1.2 mg/dL    ALT 20 10 - 52 U/L      Imaging  XR chest 2 views  Result Date: 6/16/2025  Right lower lobe pneumonia.   MACRO: None   Signed by: Radha Mccarthy 6/16/2025 1:49 AM Dictation workstation:   [id number]      Cardiology, Vascular, and Other Imaging  No other imaging results found for the past 2 days       I personally reviewed and interpreted the above results, multiple of which contributed to my medical making decisions.        Assessment/Plan       CAP  Tobacco abuse  -wean O2 as able (baseline RA)  -duonebs  -IV antibiotics  -mucinex, zyrtec, tessalon PRN  -tylenol for fevers  -toradol for pain  -urine antigens, sputum culture, blood cultures, procal  -MRSA screen  -cessation counseling      Fluids: oral  Nutrition: general  Bowel prophylaxis:   DVT prophylaxis:  SCDs        Mariola Gomes MD          [1] History reviewed. No pertinent past medical history.  [2] History reviewed. No pertinent surgical history.  [3] History reviewed. No pertinent surgical history.  [4]   Social History  Tobacco Use   Smoking Status Every Day    Types: Cigarettes   Smokeless Tobacco Never   [5] No family history on file.  [6]   Allergies  Allergen Reactions    Bee Venom Protein (Honey Bee) Swelling   [7]   Allergies  Allergen Reactions    Bee Venom Protein (Honey Bee) Swelling

## 2025-06-16 NOTE — LETTER
June 17, 2025     Patient: Keith Pederson   YOB: 1980   Date of Visit: 6/16/2025       To Whom It May Concern:    Keith Pederson was hospitalized for a medical condition from 6/16/25 to 6/17/25.  He may return to work on Monday June 23, 2025.     If you have any questions or concerns, please don't hesitate to call.         Sincerely,     Dr. Shaun Quintana, DO

## 2025-06-16 NOTE — CARE PLAN
Problem: Pain - Adult  Goal: Verbalizes/displays adequate comfort level or baseline comfort level  Outcome: Progressing     Problem: Safety - Adult  Goal: Free from fall injury  Outcome: Progressing     Problem: Discharge Planning  Goal: Discharge to home or other facility with appropriate resources  Outcome: Progressing     Problem: Chronic Conditions and Co-morbidities  Goal: Patient's chronic conditions and co-morbidity symptoms are monitored and maintained or improved  Outcome: Progressing     Problem: Nutrition  Goal: Nutrient intake appropriate for maintaining nutritional needs  Outcome: Progressing   The patient's goals for the shift include      The clinical goals for the shift include to feel better and go home    Over the shift, the patient did not make progress toward the following goals. Barriers to progression include . Recommendations to address these barriers include .

## 2025-06-17 ENCOUNTER — PHARMACY VISIT (OUTPATIENT)
Dept: PHARMACY | Facility: CLINIC | Age: 45
End: 2025-06-17

## 2025-06-17 VITALS
DIASTOLIC BLOOD PRESSURE: 83 MMHG | OXYGEN SATURATION: 95 % | WEIGHT: 207.67 LBS | HEART RATE: 70 BPM | RESPIRATION RATE: 16 BRPM | SYSTOLIC BLOOD PRESSURE: 123 MMHG | HEIGHT: 71 IN | TEMPERATURE: 97.2 F | BODY MASS INDEX: 29.07 KG/M2

## 2025-06-17 PROBLEM — J18.9 PNEUMONIA DUE TO INFECTIOUS ORGANISM, UNSPECIFIED LATERALITY, UNSPECIFIED PART OF LUNG: Status: ACTIVE | Noted: 2025-06-17

## 2025-06-17 LAB
ANION GAP SERPL CALC-SCNC: 11 MMOL/L (ref 10–20)
BUN SERPL-MCNC: 11 MG/DL (ref 6–23)
CALCIUM SERPL-MCNC: 8.7 MG/DL (ref 8.6–10.3)
CHLORIDE SERPL-SCNC: 105 MMOL/L (ref 98–107)
CO2 SERPL-SCNC: 22 MMOL/L (ref 21–32)
CREAT SERPL-MCNC: 0.96 MG/DL (ref 0.5–1.3)
EGFRCR SERPLBLD CKD-EPI 2021: >90 ML/MIN/1.73M*2
ERYTHROCYTE [DISTWIDTH] IN BLOOD BY AUTOMATED COUNT: 13.2 % (ref 11.5–14.5)
GLUCOSE SERPL-MCNC: 94 MG/DL (ref 74–99)
HCT VFR BLD AUTO: 45.2 % (ref 41–52)
HGB BLD-MCNC: 15 G/DL (ref 13.5–17.5)
LEGIONELLA AG UR QL: NEGATIVE
MCH RBC QN AUTO: 30.4 PG (ref 26–34)
MCHC RBC AUTO-ENTMCNC: 33.2 G/DL (ref 32–36)
MCV RBC AUTO: 92 FL (ref 80–100)
NRBC BLD-RTO: 0 /100 WBCS (ref 0–0)
PLATELET # BLD AUTO: 251 X10*3/UL (ref 150–450)
POTASSIUM SERPL-SCNC: 4.4 MMOL/L (ref 3.5–5.3)
RBC # BLD AUTO: 4.94 X10*6/UL (ref 4.5–5.9)
S PNEUM AG UR QL: POSITIVE
SODIUM SERPL-SCNC: 134 MMOL/L (ref 136–145)
STAPHYLOCOCCUS SPEC CULT: NORMAL
WBC # BLD AUTO: 15.6 X10*3/UL (ref 4.4–11.3)

## 2025-06-17 PROCEDURE — 96366 THER/PROPH/DIAG IV INF ADDON: CPT

## 2025-06-17 PROCEDURE — 96365 THER/PROPH/DIAG IV INF INIT: CPT | Mod: 59

## 2025-06-17 PROCEDURE — 99239 HOSP IP/OBS DSCHRG MGMT >30: CPT | Performed by: STUDENT IN AN ORGANIZED HEALTH CARE EDUCATION/TRAINING PROGRAM

## 2025-06-17 PROCEDURE — G0378 HOSPITAL OBSERVATION PER HR: HCPCS

## 2025-06-17 PROCEDURE — 2500000001 HC RX 250 WO HCPCS SELF ADMINISTERED DRUGS (ALT 637 FOR MEDICARE OP): Performed by: HOSPITALIST

## 2025-06-17 PROCEDURE — 85027 COMPLETE CBC AUTOMATED: CPT | Performed by: HOSPITALIST

## 2025-06-17 PROCEDURE — RXMED WILLOW AMBULATORY MEDICATION CHARGE

## 2025-06-17 PROCEDURE — 80048 BASIC METABOLIC PNL TOTAL CA: CPT | Performed by: HOSPITALIST

## 2025-06-17 PROCEDURE — 36415 COLL VENOUS BLD VENIPUNCTURE: CPT | Performed by: HOSPITALIST

## 2025-06-17 PROCEDURE — RXOTC WILLOW AMBULATORY OTC CHARGE

## 2025-06-17 PROCEDURE — 2500000004 HC RX 250 GENERAL PHARMACY W/ HCPCS (ALT 636 FOR OP/ED): Performed by: STUDENT IN AN ORGANIZED HEALTH CARE EDUCATION/TRAINING PROGRAM

## 2025-06-17 RX ORDER — ALBUTEROL SULFATE 90 UG/1
2 INHALANT RESPIRATORY (INHALATION) EVERY 4 HOURS PRN
Qty: 6.7 G | Refills: 0 | Status: SHIPPED | OUTPATIENT
Start: 2025-06-17

## 2025-06-17 RX ORDER — CEFDINIR 300 MG/1
300 CAPSULE ORAL 2 TIMES DAILY
Qty: 12 CAPSULE | Refills: 0 | Status: SHIPPED | OUTPATIENT
Start: 2025-06-17 | End: 2025-06-17

## 2025-06-17 RX ORDER — GUAIFENESIN 600 MG/1
1200 TABLET, EXTENDED RELEASE ORAL 2 TIMES DAILY
Qty: 28 TABLET | Refills: 0 | Status: SHIPPED | OUTPATIENT
Start: 2025-06-17

## 2025-06-17 RX ORDER — AZITHROMYCIN 500 MG/1
500 TABLET, FILM COATED ORAL DAILY
Qty: 4 TABLET | Refills: 0 | Status: SHIPPED | OUTPATIENT
Start: 2025-06-17

## 2025-06-17 RX ORDER — GUAIFENESIN 600 MG/1
1200 TABLET, EXTENDED RELEASE ORAL 2 TIMES DAILY
Qty: 28 TABLET | Refills: 0 | Status: SHIPPED | OUTPATIENT
Start: 2025-06-17 | End: 2025-06-17

## 2025-06-17 RX ORDER — BENZONATATE 100 MG/1
100 CAPSULE ORAL 3 TIMES DAILY PRN
Qty: 20 CAPSULE | Refills: 0 | Status: SHIPPED | OUTPATIENT
Start: 2025-06-17

## 2025-06-17 RX ORDER — BENZONATATE 100 MG/1
100 CAPSULE ORAL 3 TIMES DAILY PRN
Qty: 20 CAPSULE | Refills: 0 | Status: SHIPPED | OUTPATIENT
Start: 2025-06-17 | End: 2025-06-17

## 2025-06-17 RX ORDER — ALBUTEROL SULFATE 90 UG/1
2 INHALANT RESPIRATORY (INHALATION) EVERY 4 HOURS PRN
Qty: 8.5 G | Refills: 0 | Status: SHIPPED | OUTPATIENT
Start: 2025-06-17 | End: 2025-06-17

## 2025-06-17 RX ORDER — CEFDINIR 300 MG/1
300 CAPSULE ORAL 2 TIMES DAILY
Qty: 12 CAPSULE | Refills: 0 | Status: SHIPPED | OUTPATIENT
Start: 2025-06-17

## 2025-06-17 RX ORDER — AZITHROMYCIN 500 MG/1
500 TABLET, FILM COATED ORAL DAILY
Qty: 4 TABLET | Refills: 0 | Status: SHIPPED | OUTPATIENT
Start: 2025-06-17 | End: 2025-06-17

## 2025-06-17 RX ADMIN — NAPROXEN 500 MG: 500 TABLET ORAL at 08:45

## 2025-06-17 RX ADMIN — AZITHROMYCIN MONOHYDRATE 500 MG: 500 INJECTION, POWDER, LYOPHILIZED, FOR SOLUTION INTRAVENOUS at 01:24

## 2025-06-17 RX ADMIN — CEFTRIAXONE 1 G: 1 INJECTION, SOLUTION INTRAVENOUS at 00:19

## 2025-06-17 ASSESSMENT — COGNITIVE AND FUNCTIONAL STATUS - GENERAL
DAILY ACTIVITIY SCORE: 24
MOBILITY SCORE: 24

## 2025-06-17 ASSESSMENT — PAIN SCALES - GENERAL
PAINLEVEL_OUTOF10: 0 - NO PAIN
PAINLEVEL_OUTOF10: 0 - NO PAIN

## 2025-06-17 ASSESSMENT — PAIN - FUNCTIONAL ASSESSMENT
PAIN_FUNCTIONAL_ASSESSMENT: 0-10
PAIN_FUNCTIONAL_ASSESSMENT: 0-10

## 2025-06-17 ASSESSMENT — ACTIVITIES OF DAILY LIVING (ADL): LACK_OF_TRANSPORTATION: NO

## 2025-06-17 NOTE — DISCHARGE INSTRUCTIONS
Complete your antibiotics as prescribed.  Follow up with your PCP to have PFTs (Pulmonary Function Tests) performed to evaluate for underlying lung disease (e.g. asthma, emphysema, etc.)

## 2025-06-17 NOTE — NURSING NOTE
Discharge Note: 6/17/2025 5349 AVS and pt responsibilities reviewed with pt and copy given. CAP education reviewed with pt and information sheets given. Pt verbalizes understanding of instructions received, verbalizes understanding of when to seek medical attention, denies any home going or personal care needs. Denies further questions or concerns. Reviewed follow up appts with pt and verbalizes understanding. Denies dyspnea or SOB, none observed. Johnnie MEHTA

## 2025-06-17 NOTE — DISCHARGE SUMMARY
Discharge Diagnosis  Pneumonia of right lower lobe due to infectious organism    Issues Requiring Follow-Up  pneumonia    Discharge Meds     Medication List      START taking these medications     albuterol 90 mcg/actuation inhaler; Commonly known as: ProAir HFA;   Inhale 2 puffs every 4 hours if needed for wheezing or shortness of   breath.   azithromycin 500 mg tablet; Commonly known as: Zithromax; Take 1 tablet   (500 mg) by mouth once daily for 4 days.   benzonatate 100 mg capsule; Commonly known as: Tessalon; Take 1 capsule   (100 mg) by mouth 3 times a day as needed for cough. Do not crush or chew.   cefdinir 300 mg capsule; Commonly known as: Omnicef; Take 1 capsule (300   mg) by mouth 2 times a day for 6 days.   guaiFENesin 600 mg 12 hr tablet; Commonly known as: Mucinex; Take 2   tablets (1,200 mg) by mouth 2 times a day for 7 days. Do not crush, chew,   or split.     STOP taking these medications     dicyclomine 20 mg tablet; Commonly known as: Bentyl   ondansetron ODT 4 mg disintegrating tablet; Commonly known as:   Zofran-ODT       Test Results Pending At Discharge  Pending Labs       Order Current Status    Legionella Antigen, Urine In process    Staphylococcus aureus/MRSA colonization, Culture In process    Streptococcus pneumoniae Antigen, Urine In process    Blood Culture Preliminary result            Disposition:  home    Hospital Course   45 y.o. male presenting with cough x3 days, as well as rib pain. Pt has no pertinent medical history. Only home meds are PRN.   He is a smoker.   CXR shows RLL pneumonia.   In ED, he was placed on 2L NC. VSS. He was given azithro and ceftriaxone. Toradol was given for rib pain.     During hospital stay, patient's breathing status would improve.  He would be weaned off of oxygen by discharge.  He would be encouraged to continue smoking cessation moving forward.  He would be prescribed 4 additional days of azithromycin and 6 days of cefdinir to complete treatment of  pneumonia.  He would also be given mucinex, tessalon pearles, and albuterol inhaler to help with his cough.  He will need to establish with a PCP outpatient.  He will need official PFTs done once he is out of his sick period.  He would bbe discharged home.  Follow up with PCP in one week.  Vital signs were stable at discharge.  Patient agreed to discharge plan.    35 minutes total was spent on discharge planning including chart review, medication ordering, and discussion with family and hospital staff.            Pertinent Physical Exam At Time of Discharge  Constitutional:       Appearance: Normal appearance.   HENT:      Head: Normocephalic and atraumatic.      Nose: Nose normal.      Mouth/Throat:      Mouth: Mucous membranes are moist.   Cardiovascular:      Rate and Rhythm: Normal rate and regular rhythm.   Pulmonary:      Effort: Pulmonary effort is normal.      Comments: Slightly diminished breath sounds right lung base  Abdominal:      Palpations: Abdomen is soft.   Musculoskeletal:         General: Normal range of motion.      Cervical back: Normal range of motion and neck supple.   Skin:     General: Skin is warm and dry.   Neurological:      General: No focal deficit present.      Mental Status: He is alert and oriented to person, place, and time.     Outpatient Follow-Up  No future appointments.      Shaun Quintana DO

## 2025-06-17 NOTE — CARE PLAN
The patient's goals for the shift include      The clinical goals for the shift include to feel better and go home    Over the shift, the patient did not make progress toward the following goals. Barriers to progression include . Recommendations to address these barriers include .

## 2025-06-17 NOTE — NURSING NOTE
Discharge Note: 6/17/2025 1336 Declines wheelchair, ambulates to elevator accompanied by significant other, personal belongings taken by pt, no distress noted, no complaints voiced. Gait steady. Johnnie MEHTA

## 2025-06-17 NOTE — PROGRESS NOTES
06/17/25 1200   Discharge Planning   Living Arrangements Spouse/significant other   Support Systems Spouse/significant other   Assistance Needed none   Type of Residence Private residence   Number of Stairs to Enter Residence 20   Number of Stairs Within Residence 0   Do you have animals or pets at home? Yes   Type of Animals or Pets 1 cat   Who is requesting discharge planning? Provider   Home or Post Acute Services None   Expected Discharge Disposition Home   Does the patient need discharge transport arranged? No   Financial Resource Strain   How hard is it for you to pay for the very basics like food, housing, medical care, and heating? Somewhat   Housing Stability   In the last 12 months, was there a time when you were not able to pay the mortgage or rent on time? N   In the past 12 months, how many times have you moved where you were living? 0   At any time in the past 12 months, were you homeless or living in a shelter (including now)? N   Transportation Needs   In the past 12 months, has lack of transportation kept you from medical appointments or from getting medications? no   In the past 12 months, has lack of transportation kept you from meetings, work, or from getting things needed for daily living? No   Stroke Family Assessment   Stroke Family Assessment Needed No   Intensity of Service   Intensity of Service 0-30 min     Was notified by the  of the 3rd floor that pt significant other had requested a POA form. Also The Experience Liaison informed me the pt had questions regarding the Medicaid papers he had filled out yesterday. [facility] went to the pt room with the POA papers explained to both the pt and the S.O. to read through the papers completely before doing anything and then fill them out but do  NOT sign anything with out 2 witnesses or a notary. Then answered a few questions regarding the Medicaid forms and that once we are done with them they go to S and they will follow up with him and  it will go back 90 days on any medical bills for coverage. Plan remains home no needs. [facility] 24/24. ADOD is today. Nila Ayala BSN/RN-[facility]

## 2025-06-17 NOTE — PROGRESS NOTES
Medication Education     Medication education for Keith Pederson was provided to the patient and family for the following medication(s):  Albuterol 90 mcg/actuation inhaler: inhale 2 puffs every 4 hours if needed for wheezing or shortness of breath  Azithromycin 500 mg daily  Benzonatate 100 mg TID PRN cough  Cefdinir 300 mg BID  Guaifenesin  mg BID      Medication education provided by a Pharmacist:  ADR Counseling Medication interactions Dose, frequency, storage Proper dose, indication, possible ADRs How the medication works and benefits of taking it Benefits of taking the medication  Importance of compliance Potential duration of therapy    Identified potential barriers to education:  None    Method(s) of Education:  Verbal Demonstration Written materials provided and reviewed    An opportunity to ask questions and receive answers was provided.     Assessment of understanding the patient and family:  2= meets goals/outcomes    Additional Notes (if applicable):   Patient participated in the  Meds to Beds program via the [facility]. The above 5 medications were delivered to the patient's bedside by the pharmacist and all question were addressed at that time.   Inhaler demonstration provided for patient for new albuterol inhaler    Wendy Mcdermott, BertrandD

## 2025-06-20 LAB — BACTERIA BLD CULT: NORMAL
